# Patient Record
Sex: FEMALE | Race: WHITE
[De-identification: names, ages, dates, MRNs, and addresses within clinical notes are randomized per-mention and may not be internally consistent; named-entity substitution may affect disease eponyms.]

---

## 2018-05-03 ENCOUNTER — HOSPITAL ENCOUNTER (OUTPATIENT)
Dept: HOSPITAL 47 - LABWHC1 | Age: 28
Discharge: HOME | End: 2018-05-03
Payer: COMMERCIAL

## 2018-05-03 DIAGNOSIS — N92.6: Primary | ICD-10-CM

## 2018-05-03 PROCEDURE — 84702 CHORIONIC GONADOTROPIN TEST: CPT

## 2018-05-03 PROCEDURE — 36415 COLL VENOUS BLD VENIPUNCTURE: CPT

## 2018-09-26 ENCOUNTER — HOSPITAL ENCOUNTER (OUTPATIENT)
Dept: HOSPITAL 47 - LABWHC1 | Age: 28
Discharge: HOME | End: 2018-09-26
Attending: OBSTETRICS & GYNECOLOGY
Payer: COMMERCIAL

## 2018-09-26 DIAGNOSIS — Z3A.00: ICD-10-CM

## 2018-09-26 DIAGNOSIS — Z34.82: Primary | ICD-10-CM

## 2018-09-26 PROCEDURE — 82677 ASSAY OF ESTRIOL: CPT

## 2018-09-26 PROCEDURE — 86762 RUBELLA ANTIBODY: CPT

## 2018-09-26 PROCEDURE — 84702 CHORIONIC GONADOTROPIN TEST: CPT

## 2018-09-26 PROCEDURE — 82105 ALPHA-FETOPROTEIN SERUM: CPT

## 2018-09-26 PROCEDURE — 86336 INHIBIN A: CPT

## 2018-09-26 PROCEDURE — 36415 COLL VENOUS BLD VENIPUNCTURE: CPT

## 2018-09-27 LAB
ALPHA FETOPROTEIN (M.O.M): 1.98
FET NEURAL TUBE DEF RISK FROM MAT AGE QN: 28
GA (DAYS): 0 D
HCG MOM SERPL: 0.35
HCG MOM SERPL: 8 IU/ML
INHIBIN A MOM SERPL: 0.77
INHIBIN A SERPL-MCNC: 1.98

## 2019-02-16 ENCOUNTER — HOSPITAL ENCOUNTER (OUTPATIENT)
Dept: HOSPITAL 47 - FBPOP | Age: 29
Discharge: HOME | End: 2019-02-16
Attending: OBSTETRICS & GYNECOLOGY
Payer: COMMERCIAL

## 2019-02-16 VITALS
SYSTOLIC BLOOD PRESSURE: 128 MMHG | HEART RATE: 92 BPM | TEMPERATURE: 97.9 F | RESPIRATION RATE: 16 BRPM | DIASTOLIC BLOOD PRESSURE: 75 MMHG

## 2019-02-16 DIAGNOSIS — O47.1: Primary | ICD-10-CM

## 2019-02-16 DIAGNOSIS — Z3A.00: ICD-10-CM

## 2019-02-16 PROCEDURE — 84112 EVAL AMNIOTIC FLUID PROTEIN: CPT

## 2019-02-16 PROCEDURE — 99213 OFFICE O/P EST LOW 20 MIN: CPT

## 2019-02-16 PROCEDURE — 59025 FETAL NON-STRESS TEST: CPT

## 2019-03-04 ENCOUNTER — HOSPITAL ENCOUNTER (INPATIENT)
Dept: HOSPITAL 47 - 4FBP | Age: 29
LOS: 2 days | Discharge: HOME | End: 2019-03-06
Attending: OBSTETRICS & GYNECOLOGY | Admitting: OBSTETRICS & GYNECOLOGY
Payer: COMMERCIAL

## 2019-03-04 VITALS — BODY MASS INDEX: 38.2 KG/M2

## 2019-03-04 DIAGNOSIS — Z83.3: ICD-10-CM

## 2019-03-04 DIAGNOSIS — Z30.2: ICD-10-CM

## 2019-03-04 DIAGNOSIS — O34.211: Primary | ICD-10-CM

## 2019-03-04 DIAGNOSIS — Z3A.39: ICD-10-CM

## 2019-03-04 LAB
BASOPHILS # BLD AUTO: 0 K/UL (ref 0–0.2)
BASOPHILS NFR BLD AUTO: 0 %
EOSINOPHIL # BLD AUTO: 0.1 K/UL (ref 0–0.7)
EOSINOPHIL NFR BLD AUTO: 1 %
ERYTHROCYTE [DISTWIDTH] IN BLOOD BY AUTOMATED COUNT: 4.28 M/UL (ref 3.8–5.4)
ERYTHROCYTE [DISTWIDTH] IN BLOOD: 14.3 % (ref 11.5–15.5)
GLUCOSE BLD-MCNC: 91 MG/DL (ref 75–99)
HBA1C MFR BLD: 5.4 % (ref 4–6)
HCT VFR BLD AUTO: 35.8 % (ref 34–46)
HGB BLD-MCNC: 11.9 GM/DL (ref 11.4–16)
LYMPHOCYTES # SPEC AUTO: 2.4 K/UL (ref 1–4.8)
LYMPHOCYTES NFR SPEC AUTO: 21 %
MCH RBC QN AUTO: 27.8 PG (ref 25–35)
MCHC RBC AUTO-ENTMCNC: 33.2 G/DL (ref 31–37)
MCV RBC AUTO: 83.5 FL (ref 80–100)
MONOCYTES # BLD AUTO: 0.5 K/UL (ref 0–1)
MONOCYTES NFR BLD AUTO: 5 %
NEUTROPHILS # BLD AUTO: 8.2 K/UL (ref 1.3–7.7)
NEUTROPHILS NFR BLD AUTO: 71 %
PLATELET # BLD AUTO: 225 K/UL (ref 150–450)
WBC # BLD AUTO: 11.5 K/UL (ref 3.8–10.6)

## 2019-03-04 PROCEDURE — 86850 RBC ANTIBODY SCREEN: CPT

## 2019-03-04 PROCEDURE — 86900 BLOOD TYPING SEROLOGIC ABO: CPT

## 2019-03-04 PROCEDURE — 83036 HEMOGLOBIN GLYCOSYLATED A1C: CPT

## 2019-03-04 PROCEDURE — 0UL70CZ OCCLUSION OF BILATERAL FALLOPIAN TUBES WITH EXTRALUMINAL DEVICE, OPEN APPROACH: ICD-10-PCS

## 2019-03-04 PROCEDURE — 85025 COMPLETE CBC W/AUTO DIFF WBC: CPT

## 2019-03-04 PROCEDURE — 86901 BLOOD TYPING SEROLOGIC RH(D): CPT

## 2019-03-04 PROCEDURE — 88307 TISSUE EXAM BY PATHOLOGIST: CPT

## 2019-03-04 PROCEDURE — 90707 MMR VACCINE SC: CPT

## 2019-03-04 RX ADMIN — POTASSIUM CHLORIDE SCH: 14.9 INJECTION, SOLUTION INTRAVENOUS at 20:33

## 2019-03-04 RX ADMIN — POTASSIUM CHLORIDE SCH MLS/HR: 14.9 INJECTION, SOLUTION INTRAVENOUS at 06:32

## 2019-03-04 RX ADMIN — DOCUSATE SODIUM AND SENNOSIDES SCH EACH: 50; 8.6 TABLET ORAL at 19:40

## 2019-03-04 NOTE — P.HPOB
History of Present Illness


H&P Date: 19


Chief Complaint: Intrauterine pregnancy at term: Previous  section: 

Family planning: FARHAD Mendoza is a 29-year-old  at 39 and 5 weeks gestation arise for repeat 

 section with tubal ligation.  Her Precis course was, complicated by 

gestational diabetes type AII.  She did have well controlled sugars 1 start on 

insulin however.  Otherwise she did very well with the pregnancy and she is 

feeling well at this time.  Pertinent labs include A+ blood type, Rh antibody 

was negative, rubella was equivocal.  Hepatitis B surface antigen/RPR/GBS were 

all negative.  On physical exam vital signs are stable and afebrile.  Heart 

regular, lungs clear, extremities are without pain.  Abdomen soft nontender 

gravid uterus is noted.  Category 1 tracing is noted.





Past Medical History


Past Medical History: No Reported History


History of Any Multi-Drug Resistant Organisms: None Reported


Past Surgical History:  Section


Past Anesthesia/Blood Transfusion Reactions: No Reported Reaction


Past Psychological History: No Psychological Hx Reported


Smoking Status: Never smoker


Past Alcohol Use History: None Reported


Past Drug Use History: None Reported





- Past Family History


  ** Mother


Family Medical History: Cancer





  ** Father


Family Medical History: Diabetes Mellitus





Medications and Allergies


 Home Medications











 Medication  Instructions  Recorded  Confirmed  Type


 


Insulin Regular, Human [humulin R 15 units SQ ONCE 19 History





U-500 Kwikpen]    


 


Pnv No.95/Ferrous Fum/Folic AC 1 tab PO ONCE 19 History





[Prenatal Multivitamin Tablet]    











 Allergies











Allergy/AdvReac Type Severity Reaction Status Date / Time


 


No Known Allergies Allergy   Verified 19 18:45














Exam


Osteopathic Statement: *.  No significant issues noted on an osteopathic 

structural exam other than those noted in the History and Physical/Consult.


 Vital Signs











  Temp Pulse Resp BP Pulse Ox


 


 19 06:15  97.5 F L  101 H  16  139/80  99








 Intake and Output











 19





 22:59 06:59 14:59


 


Other:   


 


  Weight  94.801 kg 














- OBG Physical Exam


Breast: both: normal (no masses)


Abdomen: bowel sounds normal, no diffuse tenderness, no bruit present, no 

guarding noted, no hepatomegaly, no splenomegaly, no mass


Vulva: both: normal


Vagina: normal moisture, no discharge


Cervix: no lesion, no discharge


Uterus: normal size, normal contour


Adnexa: both: normal


Anus/Rectum: normal perianal skin, no rectal mass, no hemorrhoids, heme negative





Results


Result Diagrams: 


 19 05:30





 Abnormal Lab Results - Last 24 Hours (Table)











  19 Range/Units





  05:30 


 


WBC  11.5 H  (3.8-10.6)  k/uL


 


Neutrophils #  8.2 H  (1.3-7.7)  k/uL

## 2019-03-04 NOTE — P.OP
Date of Procedure: 19


Preoperative Diagnosis: 


Intrauterine pregnancy term: Previous  section: Family planning coaling 

GDM A2


Postoperative Diagnosis: 


Same


Procedure(s) Performed: 


Repeat low transverse  section with tubal occlusion


Anesthesia: spinal


Surgeon: Yusef Santigao


Assistant #1: Candice Tineo


Estimated Blood Loss (ml): 700


IV fluids (ml): 900


Urine output (ml): 100


Pathology: other (Placenta)


Condition: stable


Disposition: floor


Operative Findings: 


Male  Apgar scores of 9 and 9 at one and 5 minutes respectively and the 

weight was 6 lbs. 14 oz.


Description of Procedure: 


Patient was taken to the operating suite where a spinal anesthetic was found be 

adequate.  She was prepped and draped in the normal sterile fashion and placed 

in dorsal supine position with leftward tilt.  Initially a Pfannenstiel skin 

incision was made and this incision was then carried through to underlying 

layer of the fascia was second knife.  Fascia was then nicked in the midline 

and this opening was extended laterally with Martinez scissors.  Superior and 

inferior aspect of this incision were then grasped tented up and bluntly and 

sharply dissected off the rectus muscles.  Rectus muscles were then divided the 

midline and sharp dissection through the peritoneum was made.  This opening was 

then extended superiorly and inferiorly with good visualization of both bowel 

bladder.  Bladder blade was then placed bladder identified vesicouterine 

peritoneum entered with scissor and carried across face uterus with 

Metzenbaums.  Bladder was then bluntly dissected out of the operative field.  

Knife was then used to incise uterus.  This opening was then fully developed 

with hemostat and extended bluntly.  Head was atraumatically delivered mouth 

nares were bulb suctioned.  Nuchal cord 1 was easily reduced.  Once this was 

accomplished the remainder the baby shoulders and baby were delivered with 

gentle downward and upward traction followed by the remainder the baby.  Mouth 

and nares again were bulb suctioned and the umbilical cord was clamped cut 

usual fashion.  Nursery personnel was present to assume care.  Placenta was 

then delivered intact and Pitocin was added to the IV.  Uterus was then 

exteriorized cleared of clots and debris and closed in 1 layer with 0 Vicryl 

suture.  Once excellent hemostasis was obtained what and debris was suctioned 

from the posterior cul-de-sac.  Fallopian tubes were then identified and a 

Filshie clip was applied 2 cm from uterine cornu bilaterally.  No bleeding is 

noted in the mesosalpinx therefore all instruments were removed from the 

abdomen and uterus was reinserted into the abdomen.  Peritoneal layer was 

defined with hemostats and then closed with 0 Vicryl suture.  Fascial layer was 

then closed with 0 Vicryl suture.  One layer of 3-0 Vicryl was placed in the 

deep subcuticular tissues to reapproximate skin and close the dead space.  Skin 

was then closed with 3-0 Vicryl.  Sponge, lap, needle counts were all correct 

2.  Patient was then taken to the recovery room in stable and satisfactory 

condition.

## 2019-03-05 LAB
BASOPHILS # BLD AUTO: 0 K/UL (ref 0–0.2)
BASOPHILS NFR BLD AUTO: 0 %
EOSINOPHIL # BLD AUTO: 0.1 K/UL (ref 0–0.7)
EOSINOPHIL NFR BLD AUTO: 1 %
ERYTHROCYTE [DISTWIDTH] IN BLOOD BY AUTOMATED COUNT: 3.87 M/UL (ref 3.8–5.4)
ERYTHROCYTE [DISTWIDTH] IN BLOOD: 14.4 % (ref 11.5–15.5)
HCT VFR BLD AUTO: 33 % (ref 34–46)
HGB BLD-MCNC: 10.7 GM/DL (ref 11.4–16)
LYMPHOCYTES # SPEC AUTO: 2.4 K/UL (ref 1–4.8)
LYMPHOCYTES NFR SPEC AUTO: 28 %
MCH RBC QN AUTO: 27.7 PG (ref 25–35)
MCHC RBC AUTO-ENTMCNC: 32.5 G/DL (ref 31–37)
MCV RBC AUTO: 85.3 FL (ref 80–100)
MONOCYTES # BLD AUTO: 0.5 K/UL (ref 0–1)
MONOCYTES NFR BLD AUTO: 6 %
NEUTROPHILS # BLD AUTO: 5.3 K/UL (ref 1.3–7.7)
NEUTROPHILS NFR BLD AUTO: 62 %
PLATELET # BLD AUTO: 242 K/UL (ref 150–450)
WBC # BLD AUTO: 8.6 K/UL (ref 3.8–10.6)

## 2019-03-05 RX ADMIN — DIMETHICONE PRN MG: 80 TABLET, CHEWABLE ORAL at 22:44

## 2019-03-05 RX ADMIN — DOCUSATE SODIUM AND SENNOSIDES SCH: 50; 8.6 TABLET ORAL at 15:11

## 2019-03-05 RX ADMIN — DIMETHICONE PRN MG: 80 TABLET, CHEWABLE ORAL at 07:43

## 2019-03-05 RX ADMIN — POTASSIUM CHLORIDE SCH: 14.9 INJECTION, SOLUTION INTRAVENOUS at 01:20

## 2019-03-05 NOTE — P.PN
Progress Note - Text





Anesthesia POD 1.  Patient is status post  section under spinal 

anesthesia with intra-thecal preservative free morphine 300 g.  Mild pruritus, 

good post-op analgesia, and no headache or other complications.

## 2019-03-05 NOTE — P.PNOBGPC
Subjective





- Subjective


Principal diagnosis: Postop day 1


Interval history: 





Doing very well.  Other than generalized stiffness and soreness she has no 

complaints.


Patient reports: Reports appetite normal, Reports voiding normally, Reports 

pain well controlled, Reports ambulating normally


: doing well





Objective





- Vital Signs


Latest vital signs: 


 Vital Signs











  Temp Pulse Resp BP Pulse Ox


 


 19 08:00  98.7 F  87  16  121/70  97


 


 19 04:00  98.8 F  90  14  106/69  97


 


 19 00:00  98.9 F  87  18  109/78  98


 


 19 20:00  98.9 F  87  16  111/64  96


 


 19 16:00  98.5 F  96  18  127/74  99


 


 19 12:00  98.2 F  80  16  128/62  99








 Intake and Output











 19





 22:59 06:59 14:59


 


Intake Total 1000  


 


Output Total 100 900 


 


Balance 900 -900 


 


Intake:   


 


  Intake, IV Titration 1000  





  Amount   


 


    Lactated Ringers 1,000 ml 1000  





    @ 125 mls/hr IV .Q8H TORY   





    Rx#:[id number]   


 


Output:   


 


  Urine 100 900 


 


    Uretheral (Chappell) 100  


 


Other:   


 


  # Voids 150 1 1














- Exam


Lungs: bilateral: normal


Chest: Normal S1, Normal S2


Extremities: Present: normal


Abdomen: Present: normal appearance, soft.  Absent: distention, tenderness


Incision: Present: normal, dry, intact


Uterus: Present: normal, firm





- Labs


Labs: 


 Abnormal Lab Results - Last 24 Hours (Table)











  19 Range/Units





  06:27 


 


Hgb  10.7 L  (11.4-16.0)  gm/dL


 


Hct  33.0 L  (34.0-46.0)  %

## 2019-03-06 VITALS
HEART RATE: 95 BPM | TEMPERATURE: 98.2 F | SYSTOLIC BLOOD PRESSURE: 114 MMHG | DIASTOLIC BLOOD PRESSURE: 78 MMHG | RESPIRATION RATE: 18 BRPM

## 2019-03-06 RX ADMIN — DOCUSATE SODIUM AND SENNOSIDES SCH: 50; 8.6 TABLET ORAL at 02:02

## 2019-03-06 RX ADMIN — POTASSIUM CHLORIDE SCH: 14.9 INJECTION, SOLUTION INTRAVENOUS at 02:02

## 2019-03-06 NOTE — P.DS
Providers


Date of admission: 


03/04/19 06:05





Expected date of discharge: 03/06/19


Attending physician: 


Yusef Santiago





Primary care physician: 


Stated None





Hospital Course: 





Kelly is doing very well post op day 2.  She is ambulating, voiding, and she is 

tolerating her diet.  She voices no complaints and is requesting discharge home 

today.  Prescription for Motrin and Medora were 40 to her pharmacy.  All 

questions were answered for her and discharge instruction thoroughly reviewed.


On physical exam her vital signs are stable and afebrile.  Heart regular, lungs 

clear, extremities are without pain.  Abdomen soft and nontender.  Incision is 

clean dry and intact.  Uterus is firm and lochia is reported be light.


Assessment post op day 2.  Plan discharged home follow up with me in 1 week.


Patient Condition at Discharge: Good





Plan - Discharge Summary


New Discharge Prescriptions: 


New


   HYDROcodone/APAP 5-325MG [Norco 5-325] 1 tab PO Q4HR PRN #30 tab


     PRN Reason: Pain


   Ibuprofen [Motrin] 600 mg PO Q6HR PRN #30 tab


     PRN Reason: Pain





No Action


   Insulin Regular, Human [humulin R U-500 Kwikpen] 15 units SQ ONCE


   Pnv No.95/Ferrous Fum/Folic AC [Prenatal Multivitamin Tablet] 1 tab PO ONCE


Discharge Medication List





Insulin Regular, Human [humulin R U-500 Kwikpen] 15 units SQ ONCE 03/04/19 [

History]


Pnv No.95/Ferrous Fum/Folic AC [Prenatal Multivitamin Tablet] 1 tab PO ONCE 03/ 04/19 [History]


HYDROcodone/APAP 5-325MG [Norco 5-325] 1 tab PO Q4HR PRN #30 tab 03/06/19 [Rx]


Ibuprofen [Motrin] 600 mg PO Q6HR PRN #30 tab 03/06/19 [Rx]








Follow up Appointment(s)/Referral(s): 


Yusef Santiago DO [Doctor of Osteopathic Medicine] - 1 Week


Activity/Diet/Wound Care/Special Instructions: 


No heavy lifting, limit stairs and driving, and pelvic rest.  If any high 

temperatures, heavy bleeding, or severe pain call my office


Discharge Disposition: HOME SELF-CARE

## 2019-03-20 NOTE — P.MSEPDOC
Presenting Problems





- Arrival Data


Date of Arrival on Unit: 02/16/19


Time of Arrival on Unit: 18:45


Mode of Transport: Wheelchair





Vital Signs





- Temperature


Temperature: 97.9 F


Temperature Source: Temporal Artery Scan





- Pulse


  ** Right Brachial


Pulse Rate: 92


Pulse Assessment Method: Pulse Oximetry





- Respirations


Respiratory Rate: 16


Oxygen Delivery Method: Room Air


O2 Sat by Pulse Oximetry: 96





- Blood Pressure


  ** Right Arm


Blood Pressure: 128/75


Blood Pressure Mean: 92


Blood Pressure Source: Automatic Cuff





Medical Screen Scoring (Post)





- Cervical Exam


Dilation: 0 cm = 0


Effacement: Exam Deferred


Membranes: Intact





- Uterine Contractions


Frequency: > 5 minutes apart = 1


Duration: N/A


Intensity: N/A





- Maternal Vital Signs


Maternal Temperature: N/A


Maternal Blood Pressure: N/A


Signs of Preeclampsia: N/A


Maternal Respirations: N/A





- Fetal Assessment


Fetal Heart Rate: 135


Fetal Heart Rate - NICHD Category: Category I (Normal) = 0


NST: Reactive


Fetal Position: N/A


Fetal Station: N/A





- Total Score


Total Score (Post): 1





- Post Treatment Level of Risk


Post Treatment Level of Risk: Low (0-5)





Physician Notification (Post)





- Physician Notified


Physician Notified Date: 02/16/19


Physician Notified Time: 19:27


Spoke With: Garrett


New Order Received: Yes





- Notification Comment


Comment: Report given to Dr. Tucker including pt negative amnisure, not feeling 

contractions and feeling baby move. Orders to d/c home at this time, follow up 

thursday as scheduled.





Disposition





- Disposition


OB Disposition: Discharge to home


Discharge Date: 02/16/19


Discharge Time: 19:27


I agree with the RN Medical Screening Exam: Yes


Risk & Benefit of care provided described in d/c instruction: Yes


Diagnosis: FALSE LABOR AT OR AFTER 37 COMPLETED WEEKS OF GESTATION

## 2019-06-17 ENCOUNTER — HOSPITAL ENCOUNTER (OUTPATIENT)
Dept: HOSPITAL 47 - LABWHC1 | Age: 29
Discharge: HOME | End: 2019-06-17
Attending: INTERNAL MEDICINE
Payer: COMMERCIAL

## 2019-06-17 DIAGNOSIS — O24.419: Primary | ICD-10-CM

## 2019-06-17 LAB — HBA1C MFR BLD: 5.4 % (ref 4–6)

## 2019-06-17 PROCEDURE — 36415 COLL VENOUS BLD VENIPUNCTURE: CPT

## 2019-06-17 PROCEDURE — 83036 HEMOGLOBIN GLYCOSYLATED A1C: CPT

## 2021-12-10 ENCOUNTER — HOSPITAL ENCOUNTER (EMERGENCY)
Dept: HOSPITAL 47 - EC | Age: 31
LOS: 1 days | Discharge: HOME | End: 2021-12-11
Payer: COMMERCIAL

## 2021-12-10 VITALS — TEMPERATURE: 99 F

## 2021-12-10 VITALS — SYSTOLIC BLOOD PRESSURE: 122 MMHG | DIASTOLIC BLOOD PRESSURE: 82 MMHG

## 2021-12-10 DIAGNOSIS — Z83.3: ICD-10-CM

## 2021-12-10 DIAGNOSIS — Z79.1: ICD-10-CM

## 2021-12-10 DIAGNOSIS — Z87.891: ICD-10-CM

## 2021-12-10 DIAGNOSIS — Z79.4: ICD-10-CM

## 2021-12-10 DIAGNOSIS — J18.9: Primary | ICD-10-CM

## 2021-12-10 LAB
ALBUMIN SERPL-MCNC: 4.2 G/DL (ref 3.5–5)
ALP SERPL-CCNC: 110 U/L (ref 38–126)
ALT SERPL-CCNC: 21 U/L (ref 4–34)
AMYLASE SERPL-CCNC: 50 U/L (ref 30–110)
ANION GAP SERPL CALC-SCNC: 9 MMOL/L
APTT BLD: 23.5 SEC (ref 22–30)
AST SERPL-CCNC: 22 U/L (ref 14–36)
BASOPHILS # BLD AUTO: 0.1 K/UL (ref 0–0.2)
BASOPHILS NFR BLD AUTO: 0 %
BUN SERPL-SCNC: 9 MG/DL (ref 7–17)
CALCIUM SPEC-MCNC: 9.5 MG/DL (ref 8.4–10.2)
CHLORIDE SERPL-SCNC: 99 MMOL/L (ref 98–107)
CO2 SERPL-SCNC: 28 MMOL/L (ref 22–30)
EOSINOPHIL # BLD AUTO: 0.1 K/UL (ref 0–0.7)
EOSINOPHIL NFR BLD AUTO: 1 %
ERYTHROCYTE [DISTWIDTH] IN BLOOD BY AUTOMATED COUNT: 4.78 M/UL (ref 3.8–5.4)
ERYTHROCYTE [DISTWIDTH] IN BLOOD: 12.6 % (ref 11.5–15.5)
GLUCOSE SERPL-MCNC: 149 MG/DL (ref 74–99)
HCT VFR BLD AUTO: 41.5 % (ref 34–46)
HGB BLD-MCNC: 13.9 GM/DL (ref 11.4–16)
INR PPP: 0.9 (ref ?–1.2)
LIPASE SERPL-CCNC: 38 U/L (ref 23–300)
LYMPHOCYTES # SPEC AUTO: 1.5 K/UL (ref 1–4.8)
LYMPHOCYTES NFR SPEC AUTO: 9 %
MAGNESIUM SPEC-SCNC: 1.8 MG/DL (ref 1.6–2.3)
MCH RBC QN AUTO: 29.2 PG (ref 25–35)
MCHC RBC AUTO-ENTMCNC: 33.6 G/DL (ref 31–37)
MCV RBC AUTO: 86.8 FL (ref 80–100)
MONOCYTES # BLD AUTO: 0.8 K/UL (ref 0–1)
MONOCYTES NFR BLD AUTO: 5 %
NEUTROPHILS # BLD AUTO: 13.9 K/UL (ref 1.3–7.7)
NEUTROPHILS NFR BLD AUTO: 84 %
PLATELET # BLD AUTO: 290 K/UL (ref 150–450)
POTASSIUM SERPL-SCNC: 3.9 MMOL/L (ref 3.5–5.1)
PROT SERPL-MCNC: 7.2 G/DL (ref 6.3–8.2)
PT BLD: 9.7 SEC (ref 9–12)
SODIUM SERPL-SCNC: 136 MMOL/L (ref 137–145)
WBC # BLD AUTO: 16.5 K/UL (ref 3.8–10.6)

## 2021-12-10 PROCEDURE — 85610 PROTHROMBIN TIME: CPT

## 2021-12-10 PROCEDURE — 71275 CT ANGIOGRAPHY CHEST: CPT

## 2021-12-10 PROCEDURE — 85379 FIBRIN DEGRADATION QUANT: CPT

## 2021-12-10 PROCEDURE — 87635 SARS-COV-2 COVID-19 AMP PRB: CPT

## 2021-12-10 PROCEDURE — 93005 ELECTROCARDIOGRAM TRACING: CPT

## 2021-12-10 PROCEDURE — 84484 ASSAY OF TROPONIN QUANT: CPT

## 2021-12-10 PROCEDURE — 85025 COMPLETE CBC W/AUTO DIFF WBC: CPT

## 2021-12-10 PROCEDURE — 81025 URINE PREGNANCY TEST: CPT

## 2021-12-10 PROCEDURE — 96365 THER/PROPH/DIAG IV INF INIT: CPT

## 2021-12-10 PROCEDURE — 99285 EMERGENCY DEPT VISIT HI MDM: CPT

## 2021-12-10 PROCEDURE — 82150 ASSAY OF AMYLASE: CPT

## 2021-12-10 PROCEDURE — 36415 COLL VENOUS BLD VENIPUNCTURE: CPT

## 2021-12-10 PROCEDURE — 71045 X-RAY EXAM CHEST 1 VIEW: CPT

## 2021-12-10 PROCEDURE — 83690 ASSAY OF LIPASE: CPT

## 2021-12-10 PROCEDURE — 85730 THROMBOPLASTIN TIME PARTIAL: CPT

## 2021-12-10 PROCEDURE — 80053 COMPREHEN METABOLIC PANEL: CPT

## 2021-12-10 PROCEDURE — 87040 BLOOD CULTURE FOR BACTERIA: CPT

## 2021-12-10 PROCEDURE — 83735 ASSAY OF MAGNESIUM: CPT

## 2021-12-10 NOTE — XR
EXAMINATION TYPE: XR chest 1V portable

 

DATE OF EXAM: 12/10/2021

 

COMPARISON: NONE

 

HISTORY: Chest pain

 

TECHNIQUE: Single view

 

FINDINGS: Heart is normal. There is a mild airspace pneumonia lateral right lung base. The left lung 
is clear. There is no heart failure. There are chest leads.

 

IMPRESSION: Mild right lower lobe pneumonia. Normal heart.

## 2021-12-10 NOTE — ED
Chest Pain HPI





- General


Chief Complaint: Chest Pain


Stated Complaint: Chest Pain


Time Seen by Provider: 12/10/21 22:05


Source: patient


Mode of arrival: wheelchair


Limitations: no limitations





- History of Present Illness


MD Complaint: chest pain


-: hour(s)


Onset: during rest


Pain Location: right chest


Pain Radiation: back


Severity: severe


Quality: aching, sharp


Consistency: constant


Improves With: nothing


Worsens With: inspiration


Treatments Prior to Arrival: none





- Related Data


                                Home Medications











 Medication  Instructions  Recorded  Confirmed


 


Insulin Regular, Human [humulin R 15 units SQ ONCE 19





U-500 Kwikpen]   


 


Pnv No.95/Ferrous Fum/Folic AC 1 tab PO ONCE 19





[Prenatal Multivitamin Tablet]   








                                  Previous Rx's











 Medication  Instructions  Recorded


 


HYDROcodone/APAP 5-325MG [Norco 1 tab PO Q4HR PRN #30 tab 19





5-325]  


 


Ibuprofen [Motrin] 600 mg PO Q6HR PRN #30 tab 19


 


Azithromycin [Zithromax Z-pack (6 250 mg PO AS DIRECTED #6 tab 21





tabs)]  











                                    Allergies











Allergy/AdvReac Type Severity Reaction Status Date / Time


 


No Known Allergies Allergy   Verified 12/10/21 21:43














Review of Systems


ROS Statement: 


Those systems with pertinent positive or pertinent negative responses have been 

documented in the HPI.





ROS Other: All systems not noted in ROS Statement are negative.


Constitutional: Denies: fever, chills, weakness


Respiratory: Denies: cough, dyspnea, wheezes


Cardiovascular: Reports: as per HPI, chest pain.  Denies: palpitations, 

orthopnea, edema, syncope


Gastrointestinal: Denies: abdominal pain, nausea, vomiting, diarrhea


Genitourinary: Denies: dysuria, hematuria


Musculoskeletal: Denies: back pain


Skin: Denies: rash


Neurological: Denies: headache, weakness





EKG Findings





- EKG Results:


EKG: interpreted by ERMD, sinus rhythm, normal axis, normal QRS, normal ST/T


EKG shows: tachycardia (Rate 126 bpm)





- Blocks, Axis, Hypertrophy, ST Abn:


Chamber hypertrophy or enlargement: only voltage criteria for left ventricular 

hypertrophy





Past Medical History


Past Medical History: No Reported History


History of Any Multi-Drug Resistant Organisms: None Reported


Past Surgical History:  Section


Past Anesthesia/Blood Transfusion Reactions: No Reported Reaction


Past Psychological History: No Psychological Hx Reported


Smoking Status: Former smoker


Past Alcohol Use History: Occasional


Past Drug Use History: None Reported





- Past Family History


  ** Mother


Family Medical History: Cancer





  ** Father


Family Medical History: Diabetes Mellitus





General Exam


Limitations: no limitations


General appearance: alert, in no apparent distress


Head exam: Present: atraumatic, normocephalic


Eye exam: Present: normal appearance.  Absent: scleral icterus, conjunctival 

injection


ENT exam: Present: normal oropharynx


Neck exam: Present: normal inspection


Respiratory exam: Present: normal lung sounds bilaterally.  Absent: respiratory 

distress, wheezes, rales, rhonchi, stridor, chest wall tenderness, accessory 

muscle use, decreased breath sounds


Cardiovascular Exam: Present: normal rhythm, tachycardia, normal heart sounds.  

Absent: systolic murmur, diastolic murmur, rubs, gallop


GI/Abdominal exam: Present: soft.  Absent: distended, tenderness, guarding, 

rebound, rigid, mass, pulsatile mass, hernia


Extremities exam: Present: normal inspection, normal capillary refill.  Absent: 

pedal edema, calf tenderness


Back exam: Present: normal inspection.  Absent: CVA tenderness (R), CVA 

tenderness (L)


Neurological exam: Present: alert


Psychiatric exam: Present: anxious


Skin exam: Present: warm, dry, intact, normal color.  Absent: rash





Course


                                   Vital Signs











  12/10/21 12/10/21 12/10/21





  21:43 22:44 22:54


 


Temperature 99.0 F  


 


Pulse Rate 132 H  115 H


 


Pulse Rate [  122 H 





Pulse Oximetery   





]   


 


Respiratory 20  20





Rate   


 


Blood Pressure 115/66  122/82


 


O2 Sat by Pulse 98  99





Oximetry   














Disposition


Clinical Impression: 


 Pneumonia





Disposition: HOME SELF-CARE


Condition: Good


Instructions (If sedation given, give patient instructions):  Pneumonia (ED)


Prescriptions: 


Azithromycin [Zithromax Z-pack (6 tabs)] 250 mg PO AS DIRECTED #6 tab


Is patient prescribed a controlled substance at d/c from ED?: No


Referrals: 


Dinesh Castro [Primary Care Provider] - 1-2 days

## 2021-12-11 VITALS — HEART RATE: 102 BPM | RESPIRATION RATE: 18 BRPM

## 2021-12-11 NOTE — CT
EXAMINATION TYPE: CT chest angio for PE

 

DATE OF EXAM: 12/11/2021

 

COMPARISON: None

 

HISTORY: elevated d-dimer

 

CT DLP: 557.5 mGycm

Automated exposure control for dose reduction was used.

 

CONTRAST: 

Performed with IV Contrast, patient injected with 80 mL of Isovue 370.

 

There are 3-D post processed images.

 

There is a wedge-shaped area of consolidation in the lateral basal segment right lower lobe adjacent 
to the diaphragm. The other lung fields are fairly clear. There is no pleural effusion. Heart size is
 fairly normal. There are a few right bronchial lymph nodes up to 1.3 cm. There is no mediastinal frankie
nopathy. There are no hilar masses.

 

There is normal contrast opacification of the pulmonary arteries. There are no filling defects. Thora
cic aorta is intact. There is no aneurysm or dissection. The thoracic spine is intact. Sternum is int
act.

 

IMPRESSION:

No evidence of pulmonary embolism. Right lower lobe pneumonia. Mild right side bronchial adenopathy.